# Patient Record
Sex: FEMALE | URBAN - METROPOLITAN AREA
[De-identification: names, ages, dates, MRNs, and addresses within clinical notes are randomized per-mention and may not be internally consistent; named-entity substitution may affect disease eponyms.]

---

## 2017-07-03 ENCOUNTER — APPOINTMENT (RX ONLY)
Dept: URBAN - METROPOLITAN AREA CLINIC 116 | Facility: CLINIC | Age: 57
Setting detail: DERMATOLOGY
End: 2017-07-03

## 2017-07-03 DIAGNOSIS — L82.0 INFLAMED SEBORRHEIC KERATOSIS: ICD-10-CM

## 2017-07-03 DIAGNOSIS — L28.0 LICHEN SIMPLEX CHRONICUS: ICD-10-CM

## 2017-07-03 DIAGNOSIS — D18.0 HEMANGIOMA: ICD-10-CM

## 2017-07-03 DIAGNOSIS — L82.1 OTHER SEBORRHEIC KERATOSIS: ICD-10-CM

## 2017-07-03 DIAGNOSIS — Z80.8 FAMILY HISTORY OF MALIGNANT NEOPLASM OF OTHER ORGANS OR SYSTEMS: ICD-10-CM

## 2017-07-03 DIAGNOSIS — L81.4 OTHER MELANIN HYPERPIGMENTATION: ICD-10-CM

## 2017-07-03 DIAGNOSIS — B07.8 OTHER VIRAL WARTS: ICD-10-CM

## 2017-07-03 PROBLEM — D18.01 HEMANGIOMA OF SKIN AND SUBCUTANEOUS TISSUE: Status: ACTIVE | Noted: 2017-07-03

## 2017-07-03 PROBLEM — I10 ESSENTIAL (PRIMARY) HYPERTENSION: Status: ACTIVE | Noted: 2017-07-03

## 2017-07-03 PROCEDURE — ? PRESCRIPTION

## 2017-07-03 PROCEDURE — 17110 DESTRUCTION B9 LES UP TO 14: CPT

## 2017-07-03 PROCEDURE — ? COUNSELING

## 2017-07-03 PROCEDURE — 99203 OFFICE O/P NEW LOW 30 MIN: CPT | Mod: 25

## 2017-07-03 PROCEDURE — ? LIQUID NITROGEN

## 2017-07-03 RX ORDER — TRIAMCINOLONE ACETONIDE 1 MG/G
CREAM TOPICAL
Qty: 1 | Refills: 2 | Status: ERX | COMMUNITY
Start: 2017-07-03

## 2017-07-03 RX ADMIN — TRIAMCINOLONE ACETONIDE: 1 CREAM TOPICAL at 14:50

## 2017-07-03 ASSESSMENT — LOCATION ZONE DERM
LOCATION ZONE: TRUNK
LOCATION ZONE: NECK
LOCATION ZONE: AXILLAE

## 2017-07-03 ASSESSMENT — LOCATION DETAILED DESCRIPTION DERM
LOCATION DETAILED: SUBXIPHOID
LOCATION DETAILED: LEFT SUPERIOR UPPER BACK
LOCATION DETAILED: RIGHT MEDIAL TRAPEZIAL NECK
LOCATION DETAILED: LEFT INFERIOR UPPER BACK
LOCATION DETAILED: RIGHT MEDIAL BREAST 5-6:00 REGION
LOCATION DETAILED: EPIGASTRIC SKIN
LOCATION DETAILED: PERIUMBILICAL SKIN
LOCATION DETAILED: LEFT INFRAMAMMARY CREASE (INNER QUADRANT)
LOCATION DETAILED: RIGHT RIB CAGE
LOCATION DETAILED: LEFT MID-UPPER BACK
LOCATION DETAILED: LEFT ANTERIOR AXILLA
LOCATION DETAILED: LEFT RIB CAGE
LOCATION DETAILED: RIGHT INFERIOR UPPER BACK
LOCATION DETAILED: RIGHT INFERIOR LATERAL UPPER BACK
LOCATION DETAILED: RIGHT MID-UPPER BACK
LOCATION DETAILED: RIGHT SUPERIOR UPPER BACK

## 2017-07-03 ASSESSMENT — LOCATION SIMPLE DESCRIPTION DERM
LOCATION SIMPLE: LEFT BREAST
LOCATION SIMPLE: LEFT ANTERIOR AXILLA
LOCATION SIMPLE: RIGHT UPPER BACK
LOCATION SIMPLE: POSTERIOR NECK
LOCATION SIMPLE: RIGHT BREAST
LOCATION SIMPLE: ABDOMEN
LOCATION SIMPLE: LEFT UPPER BACK

## 2017-07-03 NOTE — PROCEDURE: LIQUID NITROGEN
Add 52 Modifier (Optional): no
Render Post-Care Instructions In Note?: yes
Consent: The patient's consent was obtained including but not limited to risks of crusting, scabbing, blistering, scarring, darker or lighter pigmentary change, recurrence, incomplete removal and infection.
Number Of Freeze-Thaw Cycles: 3 freeze-thaw cycles
Detail Level: Detailed
Pared With?: curette
Post-Care Instructions: I reviewed with the patient in detail post-care instructions. Patient is to wear sunprotection, and avoid picking at any of the treated lesions. Pt may apply Vaseline to crusted or scabbing areas.
Medical Necessity Information: It is in your best interest to select a reason for this procedure from the list below. All of these items fulfill various CMS LCD requirements except the new and changing color options.
Medical Necessity Clause: This procedure was medically necessary because the lesions that were treated were:

## 2022-11-11 ENCOUNTER — APPOINTMENT (RX ONLY)
Dept: URBAN - METROPOLITAN AREA CLINIC 100 | Facility: CLINIC | Age: 62
Setting detail: DERMATOLOGY
End: 2022-11-11

## 2022-11-11 DIAGNOSIS — I87.2 VENOUS INSUFFICIENCY (CHRONIC) (PERIPHERAL): ICD-10-CM

## 2022-11-11 PROCEDURE — 36475 ENDOVENOUS RF 1ST VEIN: CPT | Mod: RT

## 2022-11-11 PROCEDURE — ? ENDOVENOUS ABLATION

## 2022-11-11 ASSESSMENT — LOCATION ZONE DERM: LOCATION ZONE: LEG

## 2022-11-11 ASSESSMENT — LOCATION SIMPLE DESCRIPTION DERM: LOCATION SIMPLE: RIGHT KNEE

## 2022-11-11 ASSESSMENT — LOCATION DETAILED DESCRIPTION DERM: LOCATION DETAILED: RIGHT MEDIAL KNEE

## 2022-11-11 NOTE — PROCEDURE: ENDOVENOUS ABLATION
Detail Level: Detailed
Show Inventory: Show
Rf Catheter Used?: RF Smart CF7-7-60
Number Of Catheters Used: 1
Rf Sheaths Used: GENET Introducer Set
Number Of Incisions Per Microphlebectomy: 0
Tumescent Anesthesia Volume In Cc: [address] [facility]
Tumescent Anesthesia Administered By (Optional): Dr. Velia Tavarez
Hemostasis: Pressure
Estimated Blood Loss (Cc): minimal
Disposition: Compression dressings were placed and stockings. Wound care instructions were given, verbal and written.
Rf Access: at the knee
Rf Temperature (Include Units): 120 Celsius
Rf Cycles: 35 cm 7 cycles
Medical Necessity Information: LCD Guidelines vary from payer to payer. Please check with your payer's policy to determine medical necessity.
Medical Necessity Clause: This therapy was medically necessary because the patient has
Consent was obtained with risks, benefits, and alternatives discussed for the above procedures. Photographs were taken. Preoperative medications were taken as above.

## 2022-12-09 ENCOUNTER — APPOINTMENT (RX ONLY)
Dept: URBAN - METROPOLITAN AREA CLINIC 100 | Facility: CLINIC | Age: 62
Setting detail: DERMATOLOGY
End: 2022-12-09

## 2022-12-09 DIAGNOSIS — D18.0 HEMANGIOMA: ICD-10-CM

## 2022-12-09 DIAGNOSIS — I87.2 VENOUS INSUFFICIENCY (CHRONIC) (PERIPHERAL): ICD-10-CM

## 2022-12-09 DIAGNOSIS — L81.4 OTHER MELANIN HYPERPIGMENTATION: ICD-10-CM

## 2022-12-09 DIAGNOSIS — B07.8 OTHER VIRAL WARTS: ICD-10-CM

## 2022-12-09 DIAGNOSIS — D22 MELANOCYTIC NEVI: ICD-10-CM

## 2022-12-09 DIAGNOSIS — L82.1 OTHER SEBORRHEIC KERATOSIS: ICD-10-CM

## 2022-12-09 DIAGNOSIS — L81.5 LEUKODERMA, NOT ELSEWHERE CLASSIFIED: ICD-10-CM

## 2022-12-09 DIAGNOSIS — L82.0 INFLAMED SEBORRHEIC KERATOSIS: ICD-10-CM

## 2022-12-09 PROBLEM — D22.71 MELANOCYTIC NEVI OF RIGHT LOWER LIMB, INCLUDING HIP: Status: ACTIVE | Noted: 2022-12-09

## 2022-12-09 PROBLEM — D22.62 MELANOCYTIC NEVI OF LEFT UPPER LIMB, INCLUDING SHOULDER: Status: ACTIVE | Noted: 2022-12-09

## 2022-12-09 PROBLEM — D22.5 MELANOCYTIC NEVI OF TRUNK: Status: ACTIVE | Noted: 2022-12-09

## 2022-12-09 PROBLEM — D18.01 HEMANGIOMA OF SKIN AND SUBCUTANEOUS TISSUE: Status: ACTIVE | Noted: 2022-12-09

## 2022-12-09 PROBLEM — D22.61 MELANOCYTIC NEVI OF RIGHT UPPER LIMB, INCLUDING SHOULDER: Status: ACTIVE | Noted: 2022-12-09

## 2022-12-09 PROBLEM — D22.72 MELANOCYTIC NEVI OF LEFT LOWER LIMB, INCLUDING HIP: Status: ACTIVE | Noted: 2022-12-09

## 2022-12-09 PROCEDURE — 36475 ENDOVENOUS RF 1ST VEIN: CPT | Mod: LT

## 2022-12-09 PROCEDURE — ? COUNSELING

## 2022-12-09 PROCEDURE — 99213 OFFICE O/P EST LOW 20 MIN: CPT | Mod: 25

## 2022-12-09 PROCEDURE — ? LIQUID NITROGEN

## 2022-12-09 PROCEDURE — 17110 DESTRUCTION B9 LES UP TO 14: CPT

## 2022-12-09 PROCEDURE — ? SUNSCREEN RECOMMENDATIONS

## 2022-12-09 PROCEDURE — ? ENDOVENOUS ABLATION

## 2022-12-09 ASSESSMENT — LOCATION SIMPLE DESCRIPTION DERM
LOCATION SIMPLE: RIGHT THIGH
LOCATION SIMPLE: LEFT KNEE
LOCATION SIMPLE: RIGHT PRETIBIAL REGION
LOCATION SIMPLE: LEFT UPPER BACK
LOCATION SIMPLE: LEFT POSTERIOR UPPER ARM
LOCATION SIMPLE: ABDOMEN
LOCATION SIMPLE: RIGHT UPPER ARM
LOCATION SIMPLE: RIGHT BACK
LOCATION SIMPLE: LEFT UPPER ARM
LOCATION SIMPLE: LEFT PRETIBIAL REGION
LOCATION SIMPLE: RIGHT FOREHEAD

## 2022-12-09 ASSESSMENT — LOCATION DETAILED DESCRIPTION DERM
LOCATION DETAILED: PERIUMBILICAL SKIN
LOCATION DETAILED: LEFT SUPERIOR UPPER BACK
LOCATION DETAILED: LEFT PROXIMAL MEDIAL POSTERIOR UPPER ARM
LOCATION DETAILED: RIGHT ANTERIOR PROXIMAL THIGH
LOCATION DETAILED: LEFT ANTERIOR PROXIMAL UPPER ARM
LOCATION DETAILED: RIGHT SUPERIOR LATERAL UPPER BACK
LOCATION DETAILED: LEFT MEDIAL KNEE
LOCATION DETAILED: RIGHT ANTERIOR PROXIMAL UPPER ARM
LOCATION DETAILED: RIGHT MEDIAL FOREHEAD
LOCATION DETAILED: LEFT PROXIMAL PRETIBIAL REGION
LOCATION DETAILED: RIGHT PROXIMAL PRETIBIAL REGION

## 2022-12-09 ASSESSMENT — LOCATION ZONE DERM
LOCATION ZONE: TRUNK
LOCATION ZONE: FACE
LOCATION ZONE: LEG
LOCATION ZONE: ARM
LOCATION ZONE: LEG

## 2022-12-09 NOTE — PROCEDURE: ENDOVENOUS ABLATION
Detail Level: Detailed
Show Inventory: Hide
Number Of Catheters Used: 1
Number Of Incisions Per Microphlebectomy: 0
Hemostasis: Electrocautery
Estimated Blood Loss (Cc): minimal
Disposition: Compression dressings were placed and stockings. Wound care instructions were given, verbal and written.
Medical Necessity Information: LCD Guidelines vary from payer to payer. Please check with your payer's policy to determine medical necessity.
Medical Necessity Clause: This therapy was medically necessary because the patient has
Consent was obtained with risks, benefits, and alternatives discussed for the above procedures. Photographs were taken. Preoperative medications were taken as above.

## 2022-12-09 NOTE — HPI: PROCEDURE (ENDOVENOUS RADIOFREQUENCY ABLATION)
Was Usgs Also Performed?: No
Additional History: Patient presents with chief complaint of Aching, cramping at night, discomfort, heaviness, pain and swelling.

## 2022-12-09 NOTE — PROCEDURE: LIQUID NITROGEN
Post-Care Instructions: I reviewed with the patient in detail post-care instructions. Patient is to wear sunprotection, and avoid picking at any of the treated lesions. Pt may apply Vaseline to crusted or scabbing areas.
Show Spray Paint Technique Variable?: Yes
Medical Necessity Information: It is in your best interest to select a reason for this procedure from the list below. All of these items fulfill various CMS LCD requirements except the new and changing color options.
Spray Paint Text: The liquid nitrogen was applied to the skin utilizing a spray paint frosting technique.
Add 52 Modifier (Optional): no
Medical Necessity Clause: This procedure was medically necessary because the lesions that were treated were:
Consent: The patient's consent was obtained including but not limited to risks of crusting, scabbing, blistering, scarring, darker or lighter pigmentary change, recurrence, incomplete removal and infection.
Detail Level: Detailed

## 2022-12-13 ENCOUNTER — APPOINTMENT (RX ONLY)
Dept: URBAN - METROPOLITAN AREA CLINIC 100 | Facility: CLINIC | Age: 62
Setting detail: DERMATOLOGY
End: 2022-12-13

## 2022-12-13 DIAGNOSIS — I87.2 VENOUS INSUFFICIENCY (CHRONIC) (PERIPHERAL): ICD-10-CM

## 2022-12-13 PROCEDURE — 36475 ENDOVENOUS RF 1ST VEIN: CPT | Mod: RT

## 2022-12-13 PROCEDURE — ? ENDOVENOUS ABLATION

## 2022-12-13 ASSESSMENT — LOCATION SIMPLE DESCRIPTION DERM: LOCATION SIMPLE: RIGHT CALF

## 2022-12-13 ASSESSMENT — LOCATION ZONE DERM: LOCATION ZONE: LEG

## 2022-12-13 ASSESSMENT — LOCATION DETAILED DESCRIPTION DERM: LOCATION DETAILED: RIGHT DISTAL CALF

## 2022-12-21 ENCOUNTER — APPOINTMENT (RX ONLY)
Dept: URBAN - METROPOLITAN AREA CLINIC 100 | Facility: CLINIC | Age: 62
Setting detail: DERMATOLOGY
End: 2022-12-21

## 2022-12-21 DIAGNOSIS — I87.2 VENOUS INSUFFICIENCY (CHRONIC) (PERIPHERAL): ICD-10-CM

## 2022-12-21 PROCEDURE — 93971 EXTREMITY STUDY: CPT

## 2022-12-21 PROCEDURE — ? LOWER EXTREMITY DOPPLER US

## 2022-12-21 NOTE — PROCEDURE: LOWER EXTREMITY DOPPLER US
Right Ssv Fragmentation And Discontinuity: No
Size Options: Use Range
Comments: See attachment.
Right Intraluminal Thrombus- No: The right deep veins were imaged from the level of the common femoral vein to the posterior tibial veins. All deep veins demonstrated compressibility without evidence of intraluminal thrombus.
Left Intraluminal Thrombus- No: The left deep veins were imaged from the level of the common femoral vein to the posterior tibial veins. All deep veins demonstrated compressibility without evidence of intraluminal thrombus.
Right Intraluminal Thrombus- Yes: The right deep veins were imaged from the level of the common femoral vein to the posterior tibial veins. There was evidence of intraluminal thrombus as noted above.
Left Intraluminal Thrombus- Yes: The left deep veins were imaged from the level of the common femoral vein to the posterior tibial veins. There was evidence of intraluminal thrombus as noted above.
Continue Conservative Therapy Text: Continue conservative treatment (such as compression stockings, OTC analgesics, and exercise) and consider intervention if no change or worsening symptoms to varicosities.
Continue Post-Procedural Therapy: Yes
See Attached Documentation Text: Please refer to the attached ultrasound documentation for complete details of the procedure and the venous findings.
Detail Level: Detailed

## 2023-02-03 ENCOUNTER — APPOINTMENT (RX ONLY)
Dept: URBAN - METROPOLITAN AREA CLINIC 100 | Facility: CLINIC | Age: 63
Setting detail: DERMATOLOGY
End: 2023-02-03

## 2023-02-03 DIAGNOSIS — I87.2 VENOUS INSUFFICIENCY (CHRONIC) (PERIPHERAL): ICD-10-CM

## 2023-02-03 PROCEDURE — ? ULTRASOUND GUIDED SCLEROTHERAPY

## 2023-02-03 PROCEDURE — 36470 NJX SCLRSNT 1 INCMPTNT VEIN: CPT | Mod: RT

## 2023-02-03 PROCEDURE — 76942 ECHO GUIDE FOR BIOPSY: CPT

## 2023-02-03 ASSESSMENT — LOCATION DETAILED DESCRIPTION DERM: LOCATION DETAILED: RIGHT PROXIMAL PRETIBIAL REGION

## 2023-02-03 ASSESSMENT — LOCATION ZONE DERM: LOCATION ZONE: LEG

## 2023-02-03 ASSESSMENT — LOCATION SIMPLE DESCRIPTION DERM: LOCATION SIMPLE: RIGHT PRETIBIAL REGION

## 2023-02-03 NOTE — PROCEDURE: ULTRASOUND GUIDED SCLEROTHERAPY
Sclerosant Volume (Cc): 10
Sclerosant Volume (Cc): 3
Consent was obtained with risks, benefits, and alternatives discussed for the above procedures. Photographs were taken.
Sclerosant (A) %: 0.50
Disposition: Compression stockings and short stretch elastic bandages were placed postoperatively.
Number Of Injections (Will Not Render If 0): 0
Render Post Care In Note: No
Post-Care Instructions: Compression for 3 weeks is critical to optimize your recovery and results. Compliance with compression helps to prevent blood clots and minimizes pain, swelling, bruising, skin discoloration (staining) and the recurrence of vessels. Materials to gather for your wound care (all available over the counter): Compression stockings x 2 pairs, 4 x 4 gauze, Comprilan wrap: 8 cm and 10 cm width wrap, Medical adhesive tape. Compression and Wound care;  Leg compression for 3 weeks is tran to your success and safety. Compression at night is only needed the first day. After that, compression is needed only during waking hours. However, if your leg feels better with compression at night, then you may continue compression at night as tolerated. After the sclerotherapy procedure, 2 layers compression will be placed. 1. On the skin, folded or flat gauze will cover the treated areas. 2. A compression wrap (Comprilan) will be wrapped around your leg over the gauze. Once the compression wrap is in place, a compression stocking will be worn. This two layer  compression (wrap plus stocking) should be worn for the first 24 hours if tolerated. 3. After 24 hours, remove all compressions and dressings and just wear the compression stockings only during waking hours. You will need to wear compression stockings for three weeks after your procedure, unless your physician instructs you otherwise. Activity: It is important NOT to be sitting or lying down for several hours after surgery. You may begin walking immediately after surgery. This is good for you, but take it easy. For 2 days after treatment: Avoid aerobic exercise, weight training, and all other types of exertion that increase your breathing and pulse rates. Do not get a tan for one month after sclerotherapy. Tanning increases your risk of skin discoloration. Bathing:       After 24 hours, you may shower or bathe in tepid water, but keep the compression stocking on. Avoid immersion in hot tubs. For pain or discomfort: You may take acetaminophen (TylenolTM, Extra-Strength TylenolTM or DatrilTM) as directed. Do not use aspirin, products containing aspirin, or ibuprofen (for example AdvilTM or MotrinTM) for five days after your surgery, unless approved by your physician. Dietary restrictions: Do not drink alcoholic beverages for two days after your sclerotherapy procedure. Possible side effects following sclerotherapy:  After sclerotherapy, mild swelling is expected. The injection sites may also become bruised or gray. You may also experience one or more of the following side effects, which almost always go away within one to four months:       Darkening of the injected veins. Brownish staining of the skin. Small clotted vessels under the surface of the skin that you can feel. Bruising of the injection sites:       What to do about bruising - This will resolve within 2-3 weeks. If you wish the bruising to disappear sooner, then applying Arnicare cream (over the counter, health food stores) will help. What to do if you feel small clotted vessels under the surface of the skin:       Call us for a follow up appointment. These small clots can be drained through a small nick. Draining these small clots will help you heal faster and with less discoloration. Contact your physician if you experience any of the following:       Treated areas become increasingly sore, tender, red or warm. Acetaminophen does not relieve your discomfort. Injection sites turn black or the skin around the site breaks down. Ulceration of the injection sites. You develop blisters from the tape. You develop significant swelling or pain in the leg. Darkening of large areas of the skin or foot.
Detail Level: Detailed

## 2023-02-21 ENCOUNTER — APPOINTMENT (RX ONLY)
Dept: URBAN - METROPOLITAN AREA CLINIC 100 | Facility: CLINIC | Age: 63
Setting detail: DERMATOLOGY
End: 2023-02-21

## 2023-02-21 DIAGNOSIS — I87.2 VENOUS INSUFFICIENCY (CHRONIC) (PERIPHERAL): ICD-10-CM

## 2023-02-21 DIAGNOSIS — I83.89 VARICOSE VEINS OF LOWER EXTREMITIES WITH OTHER COMPLICATIONS: ICD-10-CM

## 2023-02-21 PROBLEM — I83.899 VARICOSE VEINS OF UNSPECIFIED LOWER EXTREMITY WITH OTHER COMPLICATIONS: Status: ACTIVE | Noted: 2023-02-21

## 2023-02-21 PROCEDURE — ? VISUAL SCLEROTHERAPY

## 2023-02-21 PROCEDURE — 36471 NJX SCLRSNT MLT INCMPTNT VN: CPT | Mod: LT

## 2023-02-21 ASSESSMENT — LOCATION SIMPLE DESCRIPTION DERM
LOCATION SIMPLE: LEFT PRETIBIAL REGION
LOCATION SIMPLE: LEFT ANKLE
LOCATION SIMPLE: LEFT LOWER LEG

## 2023-02-21 ASSESSMENT — LOCATION DETAILED DESCRIPTION DERM
LOCATION DETAILED: LEFT LATERAL PROXIMAL CALF
LOCATION DETAILED: LEFT DISTAL PRETIBIAL REGION
LOCATION DETAILED: LEFT ANKLE
LOCATION DETAILED: LEFT MEDIAL DISTAL PRETIBIAL REGION
LOCATION DETAILED: LEFT LATERAL DISTAL CALF
LOCATION DETAILED: LEFT PROXIMAL PRETIBIAL REGION
LOCATION DETAILED: LEFT DISTAL LATERAL PRETIBIAL REGION

## 2023-02-21 ASSESSMENT — LOCATION ZONE DERM: LOCATION ZONE: LEG

## 2023-02-21 NOTE — PROCEDURE: VISUAL SCLEROTHERAPY
Sclerosant Volume (Cc): 10
Post-Care Instructions: Compression for 3 weeks is critical to optimize your recovery and results. Compliance with compression helps to prevent blood clots and minimizes pain, swelling, bruising, skin discoloration (staining) and the recurrence of vessels. Materials to gather for your wound care (all available over the counter): Compression stockings x 2 pairs, 4 x 4 gauze, Comprilan wrap: 8 cm and 10 cm width wrap, Medical adhesive tape. Compression and Wound care;  Leg compression for 3 weeks is tran to your success and safety. Compression at night is only needed the first day. After that, compression is needed only during waking hours. However, if your leg feels better with compression at night, then you may continue compression at night as tolerated. After the sclerotherapy procedure, 2 layers compression will be placed. 1. On the skin, folded or flat gauze will cover the treated areas. 2. A compression wrap (Comprilan) will be wrapped around your leg over the gauze. Once the compression wrap is in place, a compression stocking will be worn. This two layer  compression (wrap plus stocking) should be worn for the first 24 hours if tolerated. 3. After 24 hours, remove all compressions and dressings and just wear the compression stockings only during waking hours. You will need to wear compression stockings for three weeks after your procedure, unless your physician instructs you otherwise. Activity: It is important NOT to be sitting or lying down for several hours after surgery. You may begin walking immediately after surgery. This is good for you, but take it easy. For 2 days after treatment: Avoid aerobic exercise, weight training, and all other types of exertion that increase your breathing and pulse rates. Do not get a tan for one month after sclerotherapy. Tanning increases your risk of skin discoloration. Bathing:       After 24 hours, you may shower or bathe in tepid water, but keep the compression stocking on. Avoid immersion in hot tubs. For pain or discomfort: You may take acetaminophen (TylenolTM, Extra-Strength TylenolTM or DatrilTM) as directed. Do not use aspirin, products containing aspirin, or ibuprofen (for example AdvilTM or MotrinTM) for five days after your surgery, unless approved by your physician. Dietary restrictions: Do not drink alcoholic beverages for two days after your sclerotherapy procedure. Possible side effects following sclerotherapy:  After sclerotherapy, mild swelling is expected. The injection sites may also become bruised or gray. You may also experience one or more of the following side effects, which almost always go away within one to four months:       Darkening of the injected veins. Brownish staining of the skin. Small clotted vessels under the surface of the skin that you can feel. Bruising of the injection sites:       What to do about bruising - This will resolve within 2-3 weeks. If you wish the bruising to disappear sooner, then applying Arnicare cream (over the counter, health food stores) will help. What to do if you feel small clotted vessels under the surface of the skin:       Call us for a follow up appointment. These small clots can be drained through a small nick. Draining these small clots will help you heal faster and with less discoloration. Contact your physician if you experience any of the following:       Treated areas become increasingly sore, tender, red or warm. Acetaminophen does not relieve your discomfort. Injection sites turn black or the skin around the site breaks down. Ulceration of the injection sites. You develop blisters from the tape. You develop significant swelling or pain in the leg. Darkening of large areas of the skin or foot.
Detail Level: Simple
Consent was obtained with risks, benefits, and alternatives discussed for the above procedures. Photographs were taken.
Number Of Injections (Will Not Render If 0): 0
Disposition: Compression stockings and short stretch elastic bandages were placed postoperatively.
Sclerosant Volume (Cc): 3
Sclerosant (A) %: 0.25
Render Post Care In Note: No

## 2023-03-14 ENCOUNTER — APPOINTMENT (RX ONLY)
Dept: URBAN - METROPOLITAN AREA CLINIC 100 | Facility: CLINIC | Age: 63
Setting detail: DERMATOLOGY
End: 2023-03-14

## 2023-03-14 DIAGNOSIS — I83.89 VARICOSE VEINS OF LOWER EXTREMITIES WITH OTHER COMPLICATIONS: ICD-10-CM

## 2023-03-14 DIAGNOSIS — I87.2 VENOUS INSUFFICIENCY (CHRONIC) (PERIPHERAL): ICD-10-CM

## 2023-03-14 PROBLEM — I83.899 VARICOSE VEINS OF UNSPECIFIED LOWER EXTREMITY WITH OTHER COMPLICATIONS: Status: ACTIVE | Noted: 2023-03-14

## 2023-03-14 PROCEDURE — ? VISUAL SCLEROTHERAPY

## 2023-03-14 PROCEDURE — 36471 NJX SCLRSNT MLT INCMPTNT VN: CPT | Mod: RT

## 2023-03-14 ASSESSMENT — LOCATION SIMPLE DESCRIPTION DERM
LOCATION SIMPLE: RIGHT THIGH
LOCATION SIMPLE: RIGHT ANKLE
LOCATION SIMPLE: RIGHT PRETIBIAL REGION

## 2023-03-14 ASSESSMENT — LOCATION DETAILED DESCRIPTION DERM
LOCATION DETAILED: RIGHT ANTERIOR LATERAL DISTAL THIGH
LOCATION DETAILED: RIGHT PROXIMAL PRETIBIAL REGION
LOCATION DETAILED: RIGHT ANKLE
LOCATION DETAILED: RIGHT DISTAL PRETIBIAL REGION
LOCATION DETAILED: RIGHT ANTERIOR PROXIMAL THIGH

## 2023-03-14 ASSESSMENT — LOCATION ZONE DERM: LOCATION ZONE: LEG

## 2023-03-14 NOTE — PROCEDURE: VISUAL SCLEROTHERAPY
Render Post Care In Note: No
Number Of Injections (Will Not Render If 0): 0
Sclerosant (A) %: 0.25
Post-Care Instructions: Compression for 3 weeks is critical to optimize your recovery and results. Compliance with compression helps to prevent blood clots and minimizes pain, swelling, bruising, skin discoloration (staining) and the recurrence of vessels. Materials to gather for your wound care (all available over the counter): Compression stockings x 2 pairs, 4 x 4 gauze, Comprilan wrap: 8 cm and 10 cm width wrap, Medical adhesive tape. Compression and Wound care;  Leg compression for 3 weeks is tran to your success and safety. Compression at night is only needed the first day. After that, compression is needed only during waking hours. However, if your leg feels better with compression at night, then you may continue compression at night as tolerated. After the sclerotherapy procedure, 2 layers compression will be placed. 1. On the skin, folded or flat gauze will cover the treated areas. 2. A compression wrap (Comprilan) will be wrapped around your leg over the gauze. Once the compression wrap is in place, a compression stocking will be worn. This two layer  compression (wrap plus stocking) should be worn for the first 24 hours if tolerated. 3. After 24 hours, remove all compressions and dressings and just wear the compression stockings only during waking hours. You will need to wear compression stockings for three weeks after your procedure, unless your physician instructs you otherwise. Activity: It is important NOT to be sitting or lying down for several hours after surgery. You may begin walking immediately after surgery. This is good for you, but take it easy. For 2 days after treatment: Avoid aerobic exercise, weight training, and all other types of exertion that increase your breathing and pulse rates. Do not get a tan for one month after sclerotherapy. Tanning increases your risk of skin discoloration. Bathing:       After 24 hours, you may shower or bathe in tepid water, but keep the compression stocking on. Avoid immersion in hot tubs. For pain or discomfort: You may take acetaminophen (TylenolTM, Extra-Strength TylenolTM or DatrilTM) as directed. Do not use aspirin, products containing aspirin, or ibuprofen (for example AdvilTM or MotrinTM) for five days after your surgery, unless approved by your physician. Dietary restrictions: Do not drink alcoholic beverages for two days after your sclerotherapy procedure. Possible side effects following sclerotherapy:  After sclerotherapy, mild swelling is expected. The injection sites may also become bruised or gray. You may also experience one or more of the following side effects, which almost always go away within one to four months:       Darkening of the injected veins. Brownish staining of the skin. Small clotted vessels under the surface of the skin that you can feel. Bruising of the injection sites:       What to do about bruising - This will resolve within 2-3 weeks. If you wish the bruising to disappear sooner, then applying Arnicare cream (over the counter, health food stores) will help. What to do if you feel small clotted vessels under the surface of the skin:       Call us for a follow up appointment. These small clots can be drained through a small nick. Draining these small clots will help you heal faster and with less discoloration. Contact your physician if you experience any of the following:       Treated areas become increasingly sore, tender, red or warm. Acetaminophen does not relieve your discomfort. Injection sites turn black or the skin around the site breaks down. Ulceration of the injection sites. You develop blisters from the tape. You develop significant swelling or pain in the leg. Darkening of large areas of the skin or foot.
Disposition: Compression stockings and short stretch elastic bandages were placed postoperatively.
Sclerosant Volume (Cc): 10
Sclerosant Volume (Cc): 3
Detail Level: Simple
Consent was obtained with risks, benefits, and alternatives discussed for the above procedures. Photographs were taken.

## 2023-04-11 ENCOUNTER — APPOINTMENT (RX ONLY)
Dept: URBAN - METROPOLITAN AREA CLINIC 100 | Facility: CLINIC | Age: 63
Setting detail: DERMATOLOGY
End: 2023-04-11

## 2023-04-11 DIAGNOSIS — I83.89 VARICOSE VEINS OF LOWER EXTREMITIES WITH OTHER COMPLICATIONS: ICD-10-CM

## 2023-04-11 DIAGNOSIS — I87.2 VENOUS INSUFFICIENCY (CHRONIC) (PERIPHERAL): ICD-10-CM

## 2023-04-11 PROBLEM — I83.899 VARICOSE VEINS OF UNSPECIFIED LOWER EXTREMITY WITH OTHER COMPLICATIONS: Status: ACTIVE | Noted: 2023-04-11

## 2023-04-11 PROCEDURE — 36471 NJX SCLRSNT MLT INCMPTNT VN: CPT | Mod: RT

## 2023-04-11 PROCEDURE — ? VISUAL SCLEROTHERAPY

## 2023-04-11 ASSESSMENT — LOCATION DETAILED DESCRIPTION DERM
LOCATION DETAILED: LEFT PROXIMAL CALF
LOCATION DETAILED: LEFT LATERAL DISTAL CALF
LOCATION DETAILED: LEFT DISTAL POSTERIOR THIGH
LOCATION DETAILED: LEFT MEDIAL DISTAL PRETIBIAL REGION
LOCATION DETAILED: LEFT POSTERIOR ANKLE
LOCATION DETAILED: LEFT POPLITEAL SKIN
LOCATION DETAILED: LEFT DISTAL CALF
LOCATION DETAILED: LEFT LATERAL PROXIMAL CALF

## 2023-04-11 ASSESSMENT — LOCATION ZONE DERM: LOCATION ZONE: LEG

## 2023-04-11 ASSESSMENT — LOCATION SIMPLE DESCRIPTION DERM
LOCATION SIMPLE: LEFT POSTERIOR THIGH
LOCATION SIMPLE: LEFT LOWER LEG
LOCATION SIMPLE: LEFT ANKLE
LOCATION SIMPLE: LEFT PRETIBIAL REGION
LOCATION SIMPLE: LEFT CALF
LOCATION SIMPLE: LEFT POPLITEAL SKIN

## 2023-04-11 NOTE — PROCEDURE: VISUAL SCLEROTHERAPY
Number Of Injections (Will Not Render If 0): 0
Detail Level: Simple
Post-Care Instructions: Compression for 3 weeks is critical to optimize your recovery and results. Compliance with compression helps to prevent blood clots and minimizes pain, swelling, bruising, skin discoloration (staining) and the recurrence of vessels. Materials to gather for your wound care (all available over the counter): Compression stockings x 2 pairs, 4 x 4 gauze, Comprilan wrap: 8 cm and 10 cm width wrap, Medical adhesive tape. Compression and Wound care;  Leg compression for 3 weeks is tran to your success and safety. Compression at night is only needed the first day. After that, compression is needed only during waking hours. However, if your leg feels better with compression at night, then you may continue compression at night as tolerated. After the sclerotherapy procedure, 2 layers compression will be placed. 1. On the skin, folded or flat gauze will cover the treated areas. 2. A compression wrap (Comprilan) will be wrapped around your leg over the gauze. Once the compression wrap is in place, a compression stocking will be worn. This two layer  compression (wrap plus stocking) should be worn for the first 24 hours if tolerated. 3. After 24 hours, remove all compressions and dressings and just wear the compression stockings only during waking hours. You will need to wear compression stockings for three weeks after your procedure, unless your physician instructs you otherwise. Activity: It is important NOT to be sitting or lying down for several hours after surgery. You may begin walking immediately after surgery. This is good for you, but take it easy. For 2 days after treatment: Avoid aerobic exercise, weight training, and all other types of exertion that increase your breathing and pulse rates. Do not get a tan for one month after sclerotherapy. Tanning increases your risk of skin discoloration. Bathing: After 24 hours, you may shower or bathe in tepid water, but keep the compression stocking on. Avoid immersion in hot tubs. For pain or discomfort: You may take acetaminophen (TylenolTM, Extra-Strength TylenolTM or DatrilTM) as directed. Do not use aspirin, products containing aspirin, or ibuprofen (for example AdvilTM or MotrinTM) for five days after your surgery, unless approved by your physician. Dietary restrictions: Do not drink alcoholic beverages for two days after your sclerotherapy procedure. Possible side effects following sclerotherapy:  After sclerotherapy, mild swelling is expected. The injection sites may also become bruised or gray. You may also experience one or more of the following side effects, which almost always go away within one to four months:  Darkening of the injected veins. Brownish staining of the skin. Small clotted vessels under the surface of the skin that you can feel. Bruising of the injection sites:   What to do about bruising - This will resolve within 2-3 weeks. If you wish the bruising to disappear sooner, then applying Arnicare cream (over the counter, health food stores) will help. What to do if you feel small clotted vessels under the surface of the skin:  Call us for a follow up appointment. These small clots can be drained through a small nick. Draining these small clots will help you heal faster and with less discoloration. Contact your physician if you experience any of the following:  Treated areas become increasingly sore, tender, red or warm. Acetaminophen does not relieve your discomfort. Injection sites turn black or the skin around the site breaks down. Ulceration of the injection sites. You develop blisters from the tape. You develop significant swelling or pain in the leg. Darkening of large areas of the skin or foot.
Render Post Care In Note: No
Sclerosant Volume (Cc): 10
Sclerosant (A) %: 0.25
Disposition: Compression stockings and short stretch elastic bandages were placed postoperatively.
Consent was obtained with risks, benefits, and alternatives discussed for the above procedures. Photographs were taken.
Sclerosant Volume (Cc): 3

## 2023-07-06 ENCOUNTER — APPOINTMENT (RX ONLY)
Dept: URBAN - METROPOLITAN AREA CLINIC 100 | Facility: CLINIC | Age: 63
Setting detail: DERMATOLOGY
End: 2023-07-06

## 2023-07-06 DIAGNOSIS — I83.89 VARICOSE VEINS OF LOWER EXTREMITIES WITH OTHER COMPLICATIONS: ICD-10-CM

## 2023-07-06 DIAGNOSIS — I87.2 VENOUS INSUFFICIENCY (CHRONIC) (PERIPHERAL): ICD-10-CM

## 2023-07-06 PROBLEM — I83.899 VARICOSE VEINS OF UNSPECIFIED LOWER EXTREMITY WITH OTHER COMPLICATIONS: Status: ACTIVE | Noted: 2023-07-06

## 2023-07-06 PROCEDURE — ? VISUAL SCLEROTHERAPY

## 2023-07-06 PROCEDURE — 36471 NJX SCLRSNT MLT INCMPTNT VN: CPT | Mod: RT

## 2023-07-06 ASSESSMENT — LOCATION DETAILED DESCRIPTION DERM
LOCATION DETAILED: RIGHT DISTAL LATERAL POSTERIOR THIGH
LOCATION DETAILED: RIGHT DISTAL CALF
LOCATION DETAILED: RIGHT DISTAL PRETIBIAL REGION
LOCATION DETAILED: RIGHT PROXIMAL CALF
LOCATION DETAILED: RIGHT PROXIMAL PRETIBIAL REGION
LOCATION DETAILED: RIGHT POSTERIOR ANKLE
LOCATION DETAILED: RIGHT POPLITEAL SKIN

## 2023-07-06 ASSESSMENT — LOCATION SIMPLE DESCRIPTION DERM
LOCATION SIMPLE: RIGHT PRETIBIAL REGION
LOCATION SIMPLE: RIGHT POSTERIOR THIGH
LOCATION SIMPLE: RIGHT ANKLE
LOCATION SIMPLE: RIGHT CALF
LOCATION SIMPLE: RIGHT POPLITEAL SKIN

## 2023-07-06 ASSESSMENT — LOCATION ZONE DERM: LOCATION ZONE: LEG

## 2023-07-06 NOTE — PROCEDURE: VISUAL SCLEROTHERAPY
